# Patient Record
Sex: FEMALE | Race: BLACK OR AFRICAN AMERICAN
[De-identification: names, ages, dates, MRNs, and addresses within clinical notes are randomized per-mention and may not be internally consistent; named-entity substitution may affect disease eponyms.]

---

## 2018-05-01 ENCOUNTER — HOSPITAL ENCOUNTER (EMERGENCY)
Dept: HOSPITAL 74 - JER | Age: 68
LOS: 1 days | Discharge: HOME | End: 2018-05-02
Payer: COMMERCIAL

## 2018-05-01 VITALS — SYSTOLIC BLOOD PRESSURE: 155 MMHG | DIASTOLIC BLOOD PRESSURE: 71 MMHG | TEMPERATURE: 98.6 F | HEART RATE: 81 BPM

## 2018-05-01 VITALS — BODY MASS INDEX: 44.2 KG/M2

## 2018-05-01 DIAGNOSIS — M54.32: Primary | ICD-10-CM

## 2018-05-01 DIAGNOSIS — F41.9: ICD-10-CM

## 2018-05-01 DIAGNOSIS — I10: ICD-10-CM

## 2018-05-01 DIAGNOSIS — E03.9: ICD-10-CM

## 2018-05-01 DIAGNOSIS — J45.909: ICD-10-CM

## 2018-05-01 PROCEDURE — 3E0233Z INTRODUCTION OF ANTI-INFLAMMATORY INTO MUSCLE, PERCUTANEOUS APPROACH: ICD-10-PCS

## 2018-05-01 NOTE — PDOC
History of Present Illness





- General


Chief Complaint: Pain


Stated Complaint: PAIN


Time Seen by Provider: 05/01/18 22:55


History Source: Patient


Exam Limitations: No Limitations





- History of Present Illness


Initial Comments: 





CHIEF COMPLAINT:  68 y/o afebrile female c/o left sided low back pain radiating 

down to her foot x 3 days. 





HISTORY OF PRESENT ILLNESS:  The patient states this is sciatica pain because 

she has had it in the past.  She was prescribed gabapentin but states it doesn'

t work.  She denies saddle anesthesia, back trauma, fall, numbness/tingling in 

LEs. 





Vital signs on arrival are within normal limits. 





REVIEW OF SYSTEMS:


GENERAL/CONSTITUTIONAL: No fever/chills. No weakness. No weight change.


GENITOURINARY: No dysuria, frequency, or change in urination.


MUSCULOSKELETAL: +left low back pain radiating down to left foot. No joint or 

muscle swelling or pain. No neck pain.


SKIN: No rash or easy bruising.


NEUROLOGIC: No headache, vertigo, loss of consciousness, or loss of sensation.








PHYSICAL EXAM:


VITAL_SIGNS: within normal limits


GENERAL_APPEARANCE: alert, cooperative, no obvious discomfort.


MENTAL_STATUS: speech clear, oriented X 3, responds appropriately to questions.


BACK:  Pain with palpation of left buttock and left hip. No midline lumbar 

vertebral TTP or step offs. 


NEURO: motor intact and sensory intact in injured extremity.  No saddle 

anesthesia. 


EXTREMITIES: FROM of left leg and hip.


SKIN: warm, dry, good color.














Past History





- Past Medical History


Allergies/Adverse Reactions: 


 Allergies











Allergy/AdvReac Type Severity Reaction Status Date / Time


 


prednisone Allergy Intermediate Itching Verified 05/01/18 22:29











Home Medications: 


Ambulatory Orders





Albuterol Sulfate Inhaler - [Ventolin Hfa Inhaler -] 1 - 2 inh PO Q4H 11/10/17 


Azithromycin [Zithromax -] 250 mg PO DAILY 11/10/17 


Doxycycline Hyclate [Vibramycin -] 100 mg PO DAILY #7 cap 11/10/17 


Ibuprofen 800 mg PO DAILY PRN 11/10/17 


Methimazole [Tapazole -] 5 mg PO DAILY 11/10/17 


Ondansetron [Zofran -] 8 mg PO TID PRN 11/10/17 


Prednisone 0 mg PO DAILY 11/10/17 


Cyclobenzaprine HCl [Flexeril -] 10 mg PO TID #15 tablet 05/01/18 


Naproxen 500 mg PO BID #20 tablet. 05/01/18 








Asthma: Yes


COPD: No


HTN: Yes


Psychiatric Problems: Yes (ANXIETY.)


Thyroid Disease: Yes (HYPO.)





- Immunization History


Immunization Up to Date: Yes





- Suicide/Smoking/Psychosocial Hx


Smoking Status: No


Smoking History: Never smoked


Have you smoked in the past 12 months: No


Number of Cigarettes Smoked Daily: 0


Hx Alcohol Use: No


Drug/Substance Use Hx: No


Substance Use Type: None





*Physical Exam





- Vital Signs


 Last Vital Signs











Temp Pulse Resp BP Pulse Ox


 


 98.6 F   81   20   155/71   99 


 


 05/01/18 22:29  05/01/18 22:29  05/01/18 22:29  05/01/18 22:29  05/01/18 22:29














Medical Decision Making





- Medical Decision Making





A/P:  68 y/o afebrile female with left sided sciatica.  Plan is as follows:





1. IM toradol





Patient is driving home.  Will send rx for naproxen and flexeril to her 

pharmacy.  Informed her flexeril causes drowsiness.  Suggested f/u with her 

doctor and return to the ER with any worsening or concerning symptoms. 





The patient verbalizes understanding of all instructions, has no further 

questions and is awaiting discharge.

















*DC/Admit/Observation/Transfer


Diagnosis at time of Disposition: 


 Sciatica of left side








- Discharge Dispostion


Disposition: HOME


Condition at time of disposition: Good





- Referrals


Referrals: 


Chantale Herring [Primary Care Provider] - Call tomorrow





- Patient Instructions


Printed Discharge Instructions:  [facility] for Back Pain With Sciatica


Additional Instructions: 


Discharge Instructions:


-You have sciatic pain


-2 prescriptions have been sent to your pharmacy for pain; flexeril may cause 

drowsiness


-Please follow up with your doctor within 1 week


-Return to the ER with any worsening or concerning symptoms





- Post Discharge Activity